# Patient Record
Sex: FEMALE | Race: WHITE
[De-identification: names, ages, dates, MRNs, and addresses within clinical notes are randomized per-mention and may not be internally consistent; named-entity substitution may affect disease eponyms.]

---

## 2018-04-18 ENCOUNTER — HOSPITAL ENCOUNTER (OUTPATIENT)
Dept: HOSPITAL 39 - RAD | Age: 53
End: 2018-04-18
Attending: NURSE PRACTITIONER
Payer: COMMERCIAL

## 2018-04-18 DIAGNOSIS — M25.562: Primary | ICD-10-CM

## 2018-04-18 NOTE — RAD
EXAM DESCRIPTION: Knee,Left Complete



CLINICAL HISTORY: 53 years, Female, KNEE PAIN



COMPARISON: None



TECHNIQUE: Three views of the left knee



FINDINGS:



No fracture or dislocation. Bones appear normally mineralized

with normal trabecular pattern.



Narrowed appearance of medial more than lateral compartment on

frontal view. Lateral view shows normal position of the patella.

Mild posterior patellar spurring. No suprapatellar knee joint

effusion. Normal contour of quadriceps and patellar tendons.



No abnormal patellar tilt or subluxation  on patellar sunrise

view.



IMPRESSION:



Negative for fracture or dislocation.



Electronically signed by:  Justyn Casarez MD  4/18/2018 4:30

PM CDT Workstation: [id number]

## 2018-04-30 ENCOUNTER — HOSPITAL ENCOUNTER (OUTPATIENT)
Dept: HOSPITAL 39 - MRI | Age: 53
End: 2018-04-30
Attending: NURSE PRACTITIONER
Payer: COMMERCIAL

## 2018-04-30 DIAGNOSIS — M94.262: ICD-10-CM

## 2018-04-30 DIAGNOSIS — S83.502A: ICD-10-CM

## 2018-04-30 DIAGNOSIS — S83.242A: Primary | ICD-10-CM

## 2018-05-01 NOTE — MRI
EXAM DESCRIPTION: 

Knee,Left: MRI.



CLINICAL HISTORY: 

KNEE PAIN



COMPARISON: 

Left knee radiographs 4/18/2018.



TECHNIQUE: 

Multiplanar, high-field MRI, multiple sequences, without

contrast: Left knee. 



FINDINGS: 

Abnormal signal in the inferior aspect of the posterior horn of

the medial meniscus abutting the inferior inferior articular

surface. Also thickening of the body of the medial meniscus and

loss of the free edge. Medial compartment effusion with moderate

chondromalacia but no subchondral edema. Normal signal in the

lateral meniscus. Minimal chondromalacia lateral compartment but

no subchondral edema. Minimal lateral effusion.



Posterior medial fluid collection in the soft tissues. Medial

collateral ligament and elements of the lateral collateral

ligament complex are unremarkable. Edema like signal in the mid

segment of the anterior cruciate ligament which remains taut in

extension. Normal signal in the posterior cruciate.

Intra-cruciate space effusion.



Suprapatellar effusion with superior patellar plica. No loose

bodies. Minimal edema in the infrapatellar fat pad. Normal signal

in the quadriceps and patellar tendons. Patellar soft tissue

restraints are unremarkable. Grade 4 osteochondral chondrosis in

the posterior patellar ridge. Moderate chondromalacia in the

lateral femoral trochlear cartilage. 



IMPRESSION: 

1. Small inferior articular surface tear in the posterior horn of

the medial meniscus. Free edge tear of the body of the meniscus.

Chondromalacia medial compartment. Chondromalacia in the lateral

compartment with effusion but no meniscal tear. Posterior medial

Baker cysts.

2. Mild grade 1 sprain of the mid segment of the anterior

cruciate ligament. Intercruciate space effusion.

3. Grade 4 osteochondrosis posterior patellar ridge. No loose

body. Suprapatellar effusion. Chondromalacia in the lateral

trochlear cartilage.



Electronically signed by:  Dl Knowles MD  5/1/2018 12:32 PM CDT

Workstation: [id number]

## 2020-09-09 ENCOUNTER — HOSPITAL ENCOUNTER (OUTPATIENT)
Dept: HOSPITAL 39 - MRI | Age: 55
End: 2020-09-09
Attending: NURSE PRACTITIONER
Payer: SELF-PAY

## 2020-09-09 DIAGNOSIS — M94.261: ICD-10-CM

## 2020-09-09 DIAGNOSIS — M17.11: ICD-10-CM

## 2020-09-09 DIAGNOSIS — S86.811A: ICD-10-CM

## 2020-09-09 DIAGNOSIS — M25.461: ICD-10-CM

## 2020-09-09 DIAGNOSIS — S83.241A: Primary | ICD-10-CM

## 2020-09-09 DIAGNOSIS — M23.671: ICD-10-CM

## 2020-09-10 NOTE — MRI
EXAM DESCRIPTION: Knee,Right



CLINICAL HISTORY: PAIN IN RIGHT KNEE, twisted knee two days ago,

felt pop.



COMPARISON: None Available.



TECHNIQUE: MRI of the right knee is performed with multiplanar

multi sequence imaging, without intravenous contrast.



FINDINGS:



Bone and joint: No focal bony contusion or acute fracture. Mild

knee osteoarthrosis with small tricompartmental osteophyte

formation.  Moderate size knee joint effusion.

Cartilage: Grade 2-3 chondrosis involves the medial knee

compartment central weightbearing surfaces. Small areas of grade

3 chondrosis involves the lateral knee compartment. Patchy areas

of grade 3-4 chondrosis involving the chondrosis the

patellofemoral compartment especially at the patella apex and

superior trochlear groove.



Medial meniscus: Horizontal cleavage tear of the posterior horn

extending to the posterior root where there is an additional

radial tear component of the posterior horn root junction. No

meniscus fragment displacement

Lateral meniscus: Mild degenerative fraying along the anterior

tibial root.



Anterior cruciate ligament: Intact

Posterior cruciate ligament: Intact

Medial collateral ligament: Intact

Lateral collateral ligament: Intact

Popliteus tendon: Intact

Biceps femoris tendon: Intact

Iliotibial band: Intact



Medial and lateral retinaculum: Soft tissue injury along the

lateral aspect of the patella with low-grade partial tearing of

the lateral patellofemoral ligament/retinaculum (series 301 image

18). There is associated moderate edema within the lateral

infrapatellar fat pad.

Extensor mechanism: The distal quadriceps tendon is intact. The

patella tendon is intact.



Soft tissues: Posterior knee capsule sprain with fiber

irregularity and capsular edema. Small Lam cyst measures 3.7 x

1 cm. 





IMPRESSION: 



1.  Medial meniscus tears likely with acute radial tear component

along the posterior root.

2.  Posterior knee joint capsular sprain.

3.  Lateral patellofemoral retinaculum/ligament low-grade

partial-thickness tears with moderate lateral infrapatellar fat

pad edema/contusion.

4.  Mild knee osteoarthrosis with scattered small areas of

intermediate grade chondrosis.

5.  Moderate size knee joint effusion.



Electronically signed by:  Valente Alas DO  9/10/2020 8:55 AM CDT

Workstation: [id number]

## 2021-02-22 ENCOUNTER — HOSPITAL ENCOUNTER (OUTPATIENT)
Dept: HOSPITAL 39 - CT | Age: 56
End: 2021-02-22
Attending: NURSE PRACTITIONER
Payer: COMMERCIAL

## 2021-02-22 DIAGNOSIS — N20.0: ICD-10-CM

## 2021-02-22 DIAGNOSIS — K80.20: ICD-10-CM

## 2021-02-22 DIAGNOSIS — K59.00: ICD-10-CM

## 2021-02-22 DIAGNOSIS — I26.94: Primary | ICD-10-CM

## 2021-02-23 NOTE — CT
EXAM DESCRIPTION: 

Abdomen/Pelvis w/Contrast



CLINICAL HISTORY: 

MULTIPLE SUBSEGMENTAL PULMONARY EMBOLI W/O ACUTE COR PULMONALE



COMPARISON: 

None.



TECHNIQUE: 

Postcontrast CT images of the abdomen and pelvis are obtained

using standard imaging protocol. This exam was performed

according to our departmental dose-optimization program, which

includes automated exposure control, adjustment of the mA and/or

kV according to patient size and/or use of iterative

reconstruction technique .



FINDINGS: 

Visualized lung bases are unremarkable.

Liver, spleen, pancreas, and adrenal glands are unremarkable.

Partly calcified 2.6 cm gallstone in the gallbladder. No

gallbladder wall thickening. No biliary tract obstruction.

Mild vascular calcifications.



2 mm nonobstructing calcification in a lower pole calyx of the

right kidney on image 85 of series 602 is seen. Exophytic fluid

attenuation cortical cyst of the upper pole left kidney measures

4.6 x 4.9 cm.

No ureteral calcification or obstruction.



Urinary bladder poorly distended and unremarkable.

The uterus is not identified and presumed surgically absent.

Ovaries are not identified.

The appendix is small and unremarkable.



Stomach is poorly distended but unremarkable.

No small bowel obstruction or bowel wall thickening.



Mildly increased volume of formed stool in the colon. No

significant diverticular disease.

No pathologically enlarged abdominal or retroperitoneal

lymphadenopathy.



Osseous structures show no aggressive bony lesions. Mild to

moderate disc degenerative disease and facet arthropathy of the

lumbar spine is seen.



IMPRESSION: 

Cholelithiasis. Consider further evaluation with right upper

quadrant ultrasound if clinically indicated.



Minimal nonobstructing right nephrolithiasis is seen.



Mild colon constipation or obstipation is seen.





 



Electronically signed by:  Jakub Lewis MD  2/23/2021 10:00 AM [facility]

Workstation: [id number]